# Patient Record
Sex: MALE | Race: WHITE | NOT HISPANIC OR LATINO | Employment: OTHER | URBAN - METROPOLITAN AREA
[De-identification: names, ages, dates, MRNs, and addresses within clinical notes are randomized per-mention and may not be internally consistent; named-entity substitution may affect disease eponyms.]

---

## 2024-01-23 ENCOUNTER — OFFICE VISIT (OUTPATIENT)
Dept: PRIMARY CARE | Facility: CLINIC | Age: 64
End: 2024-01-23
Payer: MEDICAID

## 2024-01-23 VITALS
OXYGEN SATURATION: 95 % | DIASTOLIC BLOOD PRESSURE: 72 MMHG | HEART RATE: 51 BPM | WEIGHT: 121.4 LBS | SYSTOLIC BLOOD PRESSURE: 112 MMHG | RESPIRATION RATE: 16 BRPM | BODY MASS INDEX: 19.06 KG/M2 | HEIGHT: 67 IN

## 2024-01-23 DIAGNOSIS — Z12.5 SCREENING FOR PROSTATE CANCER: ICD-10-CM

## 2024-01-23 DIAGNOSIS — E55.9 VITAMIN D DEFICIENCY: ICD-10-CM

## 2024-01-23 DIAGNOSIS — Z13.29 SCREENING FOR THYROID DISORDER: ICD-10-CM

## 2024-01-23 DIAGNOSIS — F17.210 TOBACCO DEPENDENCE DUE TO CIGARETTES: ICD-10-CM

## 2024-01-23 DIAGNOSIS — Z00.00 ANNUAL PHYSICAL EXAM: Primary | ICD-10-CM

## 2024-01-23 DIAGNOSIS — Z13.220 SCREENING FOR LIPID DISORDERS: ICD-10-CM

## 2024-01-23 DIAGNOSIS — Z13.1 SCREENING FOR DIABETES MELLITUS: ICD-10-CM

## 2024-01-23 DIAGNOSIS — G71.09: ICD-10-CM

## 2024-01-23 DIAGNOSIS — R06.02 SOB (SHORTNESS OF BREATH): ICD-10-CM

## 2024-01-23 PROBLEM — H81.92 VESTIBULOPATHY, LEFT: Status: ACTIVE | Noted: 2024-01-23

## 2024-01-23 PROBLEM — N52.9 ERECTILE DYSFUNCTION: Status: ACTIVE | Noted: 2024-01-23

## 2024-01-23 PROBLEM — H81.09 MENIERE DISEASE: Status: ACTIVE | Noted: 2024-01-23

## 2024-01-23 PROBLEM — H90.3 ASNHL (ASYMMETRICAL SENSORINEURAL HEARING LOSS): Status: ACTIVE | Noted: 2024-01-23

## 2024-01-23 PROBLEM — R26.81 UNSTEADINESS ON FEET: Status: ACTIVE | Noted: 2024-01-23

## 2024-01-23 PROBLEM — H02.403 PTOSIS OF BOTH EYELIDS: Status: ACTIVE | Noted: 2024-01-23

## 2024-01-23 PROBLEM — H90.A22 SENSORINEURAL HEARING LOSS (SNHL) OF LEFT EAR WITH RESTRICTED HEARING OF RIGHT EAR: Status: ACTIVE | Noted: 2024-01-23

## 2024-01-23 PROBLEM — R42 DIZZY SPELLS: Status: ACTIVE | Noted: 2024-01-23

## 2024-01-23 PROBLEM — H81.02: Status: ACTIVE | Noted: 2020-01-23

## 2024-01-23 PROBLEM — H81.391 OTHER PERIPHERAL VERTIGO, RIGHT EAR: Status: ACTIVE | Noted: 2024-01-23

## 2024-01-23 PROCEDURE — 99396 PREV VISIT EST AGE 40-64: CPT | Performed by: NURSE PRACTITIONER

## 2024-01-23 PROCEDURE — 4004F PT TOBACCO SCREEN RCVD TLK: CPT | Performed by: NURSE PRACTITIONER

## 2024-01-23 PROCEDURE — 99214 OFFICE O/P EST MOD 30 MIN: CPT | Performed by: NURSE PRACTITIONER

## 2024-01-23 RX ORDER — ASPIRIN 81 MG/1
81 TABLET ORAL DAILY
COMMUNITY
Start: 2019-03-15 | End: 2024-01-23 | Stop reason: SDUPTHER

## 2024-01-23 RX ORDER — ALBUTEROL SULFATE 90 UG/1
2 AEROSOL, METERED RESPIRATORY (INHALATION) EVERY 6 HOURS PRN
Qty: 18 G | Refills: 3 | Status: SHIPPED | OUTPATIENT
Start: 2024-01-23

## 2024-01-23 RX ORDER — ALBUTEROL SULFATE 90 UG/1
AEROSOL, METERED RESPIRATORY (INHALATION)
COMMUNITY
Start: 2019-07-02 | End: 2024-01-23 | Stop reason: SDUPTHER

## 2024-01-23 RX ORDER — ASPIRIN 81 MG/1
81 TABLET ORAL DAILY
Qty: 90 TABLET | Refills: 3 | Status: SHIPPED | OUTPATIENT
Start: 2024-01-23

## 2024-01-23 ASSESSMENT — ANXIETY QUESTIONNAIRES
2. NOT BEING ABLE TO STOP OR CONTROL WORRYING: NOT AT ALL
3. WORRYING TOO MUCH ABOUT DIFFERENT THINGS: NOT AT ALL
6. BECOMING EASILY ANNOYED OR IRRITABLE: NOT AT ALL
5. BEING SO RESTLESS THAT IT IS HARD TO SIT STILL: NOT AT ALL
1. FEELING NERVOUS, ANXIOUS, OR ON EDGE: NOT AT ALL
GAD7 TOTAL SCORE: 0
4. TROUBLE RELAXING: NOT AT ALL
IF YOU CHECKED OFF ANY PROBLEMS ON THIS QUESTIONNAIRE, HOW DIFFICULT HAVE THESE PROBLEMS MADE IT FOR YOU TO DO YOUR WORK, TAKE CARE OF THINGS AT HOME, OR GET ALONG WITH OTHER PEOPLE: NOT DIFFICULT AT ALL
7. FEELING AFRAID AS IF SOMETHING AWFUL MIGHT HAPPEN: NOT AT ALL

## 2024-01-23 ASSESSMENT — ENCOUNTER SYMPTOMS
OCCASIONAL FEELINGS OF UNSTEADINESS: 0
DEPRESSION: 0
LOSS OF SENSATION IN FEET: 0

## 2024-01-23 ASSESSMENT — COLUMBIA-SUICIDE SEVERITY RATING SCALE - C-SSRS
2. HAVE YOU ACTUALLY HAD ANY THOUGHTS OF KILLING YOURSELF?: NO
1. IN THE PAST MONTH, HAVE YOU WISHED YOU WERE DEAD OR WISHED YOU COULD GO TO SLEEP AND NOT WAKE UP?: NO
6. HAVE YOU EVER DONE ANYTHING, STARTED TO DO ANYTHING, OR PREPARED TO DO ANYTHING TO END YOUR LIFE?: NO

## 2024-01-23 ASSESSMENT — PATIENT HEALTH QUESTIONNAIRE - PHQ9
2. FEELING DOWN, DEPRESSED OR HOPELESS: NOT AT ALL
1. LITTLE INTEREST OR PLEASURE IN DOING THINGS: NOT AT ALL
SUM OF ALL RESPONSES TO PHQ9 QUESTIONS 1 AND 2: 0

## 2024-01-23 NOTE — PROGRESS NOTES
"Subjective   Patient ID: Javon Ferreira is a 63 y.o. male who presents for dizzy spells.    Patient is following up for annual physical exam and management of exertional shortness of breath, tobacco abuse due to cigarettes, Ménière's disease and oculopharyngeal muscular dystrophy.  He was last seen by me in December 2021.  He completed a screening colonoscopy on 10/6/2021 with recommendation to repeat for surveillance in 10 years.  He is requesting for a refill of aspirin 81 mg daily and albuterol inhaler as needed for shortness of breath.  He tells me that he was diagnosed with ocular pharyngeal muscular dystrophy last year and was referred for speech therapy, but he failed to follow up due to distance.  He is requesting to be referred to a local speech therapist.  Patient follows with both ENT for Ménière's disease and neurologist.         Review of Systems   All other systems reviewed and are negative.      Objective   /72   Pulse 51   Resp 16   Ht 1.702 m (5' 7\")   Wt 55.1 kg (121 lb 6.4 oz)   SpO2 95%   BMI 19.01 kg/m²     Physical Exam  Constitutional:       Appearance: Normal appearance.   HENT:      Head: Normocephalic and atraumatic.      Right Ear: External ear normal.      Left Ear: External ear normal.      Nose: Nose normal.      Mouth/Throat:      Mouth: Mucous membranes are moist.   Eyes:      Extraocular Movements: Extraocular movements intact.      Conjunctiva/sclera: Conjunctivae normal.      Pupils: Pupils are equal, round, and reactive to light.   Cardiovascular:      Rate and Rhythm: Regular rhythm. Bradycardia present.      Pulses: Normal pulses.      Heart sounds: Normal heart sounds.   Pulmonary:      Effort: Pulmonary effort is normal.      Breath sounds: Normal breath sounds.   Abdominal:      General: Abdomen is flat. Bowel sounds are normal.      Palpations: Abdomen is soft.   Musculoskeletal:      Cervical back: Neck supple.   Skin:     General: Skin is warm and dry. "   Neurological:      Mental Status: He is alert and oriented to person, place, and time. Mental status is at baseline.   Psychiatric:         Mood and Affect: Mood normal.         Behavior: Behavior normal.         Thought Content: Thought content normal.         Judgment: Judgment normal.         Assessment/Plan   Problem List Items Addressed This Visit       Oculopharyngeal muscular dystrophy (CMS/HCC) - Primary    Relevant Orders    Referral to Speech Therapy     Other Visit Diagnoses       Annual physical exam        Relevant Medications    aspirin 81 mg EC tablet    Other Relevant Orders    CBC    Vitamin D deficiency        Relevant Orders    Vitamin D 25-Hydroxy,Total (for eval of Vitamin D levels)    Vitamin B12    Screening for lipid disorders        Relevant Orders    Lipid panel    Screening for thyroid disorder        Relevant Orders    TSH with reflex to Free T4 if abnormal    Screening for diabetes mellitus        Relevant Orders    Comprehensive Metabolic Panel    Screening for prostate cancer        Relevant Orders    PSA    SOB (shortness of breath)        Relevant Medications    albuterol 90 mcg/actuation inhaler    Other Relevant Orders    Follow Up In [facility] - PCP - Established

## 2024-01-23 NOTE — PATIENT INSTRUCTIONS
I have referred you to speech therapist as requested. Continue taking all current medications as prescribed, complete labs and follow up in 6 months.

## 2024-01-29 ENCOUNTER — LAB (OUTPATIENT)
Dept: LAB | Facility: LAB | Age: 64
End: 2024-01-29
Payer: MEDICAID

## 2024-01-29 DIAGNOSIS — E55.9 VITAMIN D DEFICIENCY: Primary | ICD-10-CM

## 2024-01-29 DIAGNOSIS — Z12.5 SCREENING FOR PROSTATE CANCER: ICD-10-CM

## 2024-01-29 DIAGNOSIS — E55.9 VITAMIN D DEFICIENCY: ICD-10-CM

## 2024-01-29 DIAGNOSIS — Z13.29 SCREENING FOR THYROID DISORDER: ICD-10-CM

## 2024-01-29 DIAGNOSIS — Z13.1 SCREENING FOR DIABETES MELLITUS: ICD-10-CM

## 2024-01-29 DIAGNOSIS — Z00.00 ANNUAL PHYSICAL EXAM: ICD-10-CM

## 2024-01-29 DIAGNOSIS — E78.5 HYPERLIPIDEMIA, UNSPECIFIED HYPERLIPIDEMIA TYPE: ICD-10-CM

## 2024-01-29 DIAGNOSIS — Z13.220 SCREENING FOR LIPID DISORDERS: ICD-10-CM

## 2024-01-29 LAB
25(OH)D3 SERPL-MCNC: 23 NG/ML (ref 30–100)
ALBUMIN SERPL BCP-MCNC: 4.4 G/DL (ref 3.4–5)
ALP SERPL-CCNC: 66 U/L (ref 33–136)
ALT SERPL W P-5'-P-CCNC: 22 U/L (ref 10–52)
ANION GAP SERPL CALC-SCNC: 12 MMOL/L (ref 10–20)
AST SERPL W P-5'-P-CCNC: 18 U/L (ref 9–39)
BILIRUB SERPL-MCNC: 0.5 MG/DL (ref 0–1.2)
BUN SERPL-MCNC: 18 MG/DL (ref 6–23)
CALCIUM SERPL-MCNC: 9.4 MG/DL (ref 8.6–10.3)
CHLORIDE SERPL-SCNC: 105 MMOL/L (ref 98–107)
CHOLEST SERPL-MCNC: 182 MG/DL (ref 0–199)
CHOLESTEROL/HDL RATIO: 3.5
CO2 SERPL-SCNC: 27 MMOL/L (ref 21–32)
CREAT SERPL-MCNC: 0.87 MG/DL (ref 0.5–1.3)
EGFRCR SERPLBLD CKD-EPI 2021: >90 ML/MIN/1.73M*2
ERYTHROCYTE [DISTWIDTH] IN BLOOD BY AUTOMATED COUNT: 13.3 % (ref 11.5–14.5)
GLUCOSE SERPL-MCNC: 86 MG/DL (ref 74–99)
HCT VFR BLD AUTO: 44.2 % (ref 41–52)
HDLC SERPL-MCNC: 52.3 MG/DL
HGB BLD-MCNC: 14.1 G/DL (ref 13.5–17.5)
LDLC SERPL CALC-MCNC: 115 MG/DL
MCH RBC QN AUTO: 28.8 PG (ref 26–34)
MCHC RBC AUTO-ENTMCNC: 31.9 G/DL (ref 32–36)
MCV RBC AUTO: 90 FL (ref 80–100)
NON HDL CHOLESTEROL: 130 MG/DL (ref 0–149)
NRBC BLD-RTO: 0 /100 WBCS (ref 0–0)
PLATELET # BLD AUTO: 318 X10*3/UL (ref 150–450)
POTASSIUM SERPL-SCNC: 4.4 MMOL/L (ref 3.5–5.3)
PROT SERPL-MCNC: 7.4 G/DL (ref 6.4–8.2)
PSA SERPL-MCNC: 1.04 NG/ML
RBC # BLD AUTO: 4.9 X10*6/UL (ref 4.5–5.9)
SODIUM SERPL-SCNC: 140 MMOL/L (ref 136–145)
TRIGL SERPL-MCNC: 72 MG/DL (ref 0–149)
TSH SERPL-ACNC: 2.62 MIU/L (ref 0.44–3.98)
VIT B12 SERPL-MCNC: 539 PG/ML (ref 211–911)
VLDL: 14 MG/DL (ref 0–40)
WBC # BLD AUTO: 9.1 X10*3/UL (ref 4.4–11.3)

## 2024-01-29 PROCEDURE — 82306 VITAMIN D 25 HYDROXY: CPT

## 2024-01-29 PROCEDURE — 82607 VITAMIN B-12: CPT

## 2024-01-29 PROCEDURE — 80053 COMPREHEN METABOLIC PANEL: CPT

## 2024-01-29 PROCEDURE — 84153 ASSAY OF PSA TOTAL: CPT

## 2024-01-29 PROCEDURE — 80061 LIPID PANEL: CPT

## 2024-01-29 PROCEDURE — 36415 COLL VENOUS BLD VENIPUNCTURE: CPT

## 2024-01-29 PROCEDURE — 84443 ASSAY THYROID STIM HORMONE: CPT

## 2024-01-29 PROCEDURE — 85027 COMPLETE CBC AUTOMATED: CPT

## 2024-01-29 RX ORDER — ROSUVASTATIN CALCIUM 10 MG/1
10 TABLET, COATED ORAL DAILY
Qty: 90 TABLET | Refills: 2 | Status: SHIPPED | OUTPATIENT
Start: 2024-01-29 | End: 2024-10-25

## 2024-01-29 RX ORDER — ERGOCALCIFEROL 1.25 MG/1
50000 CAPSULE ORAL
Qty: 12 CAPSULE | Refills: 2 | Status: SHIPPED | OUTPATIENT
Start: 2024-01-29 | End: 2024-10-25

## 2024-01-30 ENCOUNTER — TELEPHONE (OUTPATIENT)
Dept: PRIMARY CARE | Facility: CLINIC | Age: 64
End: 2024-01-30
Payer: MEDICAID

## 2024-01-30 NOTE — TELEPHONE ENCOUNTER
----- Message from TREMAINE Joshua-CNP sent at 1/29/2024  6:44 PM EST -----  Please tell patient that his lab results are normal except his cholesterol is high and vitamin D level is low.  I have started him on rosuvastatin 10 mg daily for high cholesterol and vitamin D 50,000 unit weekly for low vitamin D levels.

## 2024-02-22 ENCOUNTER — OFFICE VISIT (OUTPATIENT)
Dept: NEUROLOGY | Facility: CLINIC | Age: 64
End: 2024-02-22
Payer: MEDICAID

## 2024-02-22 VITALS
HEART RATE: 51 BPM | WEIGHT: 124 LBS | HEIGHT: 67 IN | BODY MASS INDEX: 19.46 KG/M2 | SYSTOLIC BLOOD PRESSURE: 150 MMHG | DIASTOLIC BLOOD PRESSURE: 78 MMHG

## 2024-02-22 DIAGNOSIS — M25.561 ARTHRALGIA OF BOTH KNEES: ICD-10-CM

## 2024-02-22 DIAGNOSIS — H81.09 MENIERE'S DISEASE, UNSPECIFIED LATERALITY: ICD-10-CM

## 2024-02-22 DIAGNOSIS — M25.562 ARTHRALGIA OF BOTH KNEES: ICD-10-CM

## 2024-02-22 DIAGNOSIS — G43.109 MIGRAINOUS VERTIGO: Primary | ICD-10-CM

## 2024-02-22 DIAGNOSIS — G71.09: ICD-10-CM

## 2024-02-22 PROCEDURE — 99214 OFFICE O/P EST MOD 30 MIN: CPT | Performed by: PSYCHIATRY & NEUROLOGY

## 2024-02-22 PROCEDURE — 4004F PT TOBACCO SCREEN RCVD TLK: CPT | Performed by: PSYCHIATRY & NEUROLOGY

## 2024-02-22 RX ORDER — RIBOFLAVIN (VITAMIN B2) 400 MG
400 TABLET ORAL DAILY
Qty: 90 TABLET | Refills: 2 | Status: SHIPPED | OUTPATIENT
Start: 2024-02-22

## 2024-02-22 NOTE — PROGRESS NOTES
Subjective     Javon Ferreira is a 64 y.o. year old male seen in follow-up for chronic dizziness with history of Ménière's disease, oculopharyngeal muscular dystrophy.    HPI    64-year-old  man with past medical history significant for Ménière's disease with predominantly left-sided hearing loss, surgery on the right forearm in the 1990s, longstanding bilateral ptosis OD worse than OS with history of same attributed to muscular dystrophy in his mother, headaches dating to adolescence, ongoing cigarette smoking.     I evaluated him initially on 5/13/2022, referred by otology. As detailed in my ambulatory EMR note from that date he presented for dizziness, giving a history dating back 6-7 years. He was given a diagnosis of Ménière's disease but has had persistent dizziness despite 2 intratympanic gentamicin injections as well as other interventions for Ménière's.     He also endorsed a long headache history albeit not a formal diagnosis of migraine. By description his headaches, dating to childhood or adolescence, were often compatible with migraine. As such I suspected a migrainous basis to at least some of his vertigo.     I reviewed a contrasted IAC protocol MRI from March 2019 as well as intracranial MRA done at the same date, without concerning findings.     His evaluation was otherwise notable for chronic ptosis historically worse on the right, with family history of ptosis and dysphagia in his mother, caring a diagnosis of muscular dystrophy (probably oculopharyngeal although her genetic testing records were not available at the time of his consultation). He is of [place] ancestry. He himself has not undergone genetic testing.     I recommended a preventive strategy for the possibility of at least some of his dizziness having a migrainous basis. He did not wish to go on conventional medications so I suggested riboflavin as a nutraceutical, 400 mg daily.     I evaluated him again on 9/28/2022.  He recounted a 6-week trial of riboflavin which did not improve his symptoms so he stopped it. He continued to note daily dizziness. I suggested a trial of magnesium gluconate 500 mg daily.  As detailed in my EMR note, his mother's medical records which he provided confirmed oculopharyngeal muscular dystrophy in her case, which we discussed provided the most plausible explanation for his symptoms of chronic bilateral ptosis and mild dysphagia.    I evaluated him most recently on 5/30/2023.  At that visit he indicated that magnesium and riboflavin were not effective and I suggested a trial of cyproheptadine 2 mg twice daily.    I also sent him for a modified barium swallow study which was completed in July and found moderate oropharyngeal dysphagia with aspiration clearing with swallow with thin liquids, penetration with low aspiration risk with nectar thick liquids and no aspiration or penetration with honey thick, purée or solids.  I referred him to speech therapy for dysphagia therapy.    He is evaluated again today in the office.    He is pending seeing a speech therapist in his area to whom his PCP has referred him.  He indicates no progressive worsening of his swallow subjectively compared to when he last saw me.  He is not having choking episodes.    He is increasingly bothered by ptosis, which on the right today often occludes the pupil.  He is interested in being evaluated for ptosis corrective surgery.  It sounds as if his mother may have had this procedure.  As it is, he often has to use his fingers to elevate his right upper lid.    Over the past month he has experienced a new phenomenon of vertigo specifically when he is lying in bed.  This seems to be when he lies on either side although he tries to avoid lying supine as he finds it less comfortable.  The spinning lasts for only a matter of seconds.    From the standpoint of Ménière's he just saw otology a couple of days ago and had another audiogram  which she indicates showed improved hearing on the left.  He is abiding by sodium and caffeine restrictions.    He has had no severe or persistent headaches recently.    He does not seem to recall having tried cyproheptadine and is not clear if he ever took it.  He indicates today that although he previously had the impression that riboflavin was ineffective after trying it for about 6 weeks, the week after he stopped that he went on a camping trip with family and felt the best he has in years.  He had virtually no vertiginous symptoms during the entire trip.  Accordingly he is wondering whether riboflavin was helpful and expresses interest in resuming it.    He endorses achy joints, particularly the knees, and reports a tick bite in the left popliteal fossa region in October 2023.  He indicates that his wife actually pulled a tick off of him.  There was subsequently a small red spot in this region, not a classic target lesion by his report.  He has not been tested for Lyme.       Review of Systems    As per the history of present illness    Patient Active Problem List   Diagnosis    ASNHL (asymmetrical sensorineural hearing loss)    Dizzy spells    Erectile dysfunction    Meniere disease    Meniere syndrome, left    Other peripheral vertigo, right ear    Oculopharyngeal muscular dystrophy (CMS/HCC)    Ptosis of both eyelids    Sensorineural hearing loss (SNHL) of left ear with restricted hearing of right ear    SOB (shortness of breath)    Tobacco dependence due to cigarettes    Unsteadiness on feet    Vestibulopathy, left    Screening for prostate cancer    Vitamin D deficiency     Past Medical History:   Diagnosis Date    Personal history of other specified conditions 01/10/2019    History of dysphagia     Past Surgical History:   Procedure Laterality Date    OTHER SURGICAL HISTORY  01/10/2019    Arm surgery     Social History     Tobacco Use    Smoking status: Every Day     Packs/day: 1     Types: Cigarettes     Smokeless tobacco: Never   Substance Use Topics    Alcohol use: Never     family history is not on file.    Current Outpatient Medications:     albuterol 90 mcg/actuation inhaler, Inhale 2 puffs every 6 hours if needed for wheezing., Disp: 18 g, Rfl: 3    aspirin 81 mg EC tablet, Take 1 tablet (81 mg) by mouth once daily., Disp: 90 tablet, Rfl: 3    ergocalciferol (Vitamin D-2) 1.25 MG (27498 UT) capsule, Take 1 capsule (50,000 Units) by mouth 1 (one) time per week., Disp: 12 capsule, Rfl: 2    rosuvastatin (Crestor) 10 mg tablet, Take 1 tablet (10 mg) by mouth once daily., Disp: 90 tablet, Rfl: 2  No Known Allergies    Objective   Neurological Exam  Physical Exam    Physical Examination:    General: Alert man who was ambulatory without assistive devices.      Cranial Nerves:  Funduscopic exam was not well visualized bilaterally on nondilated exam.  Pupils were equal, round and reactive to light with no relative afferent pupillary defect.  Extraocular movements were intact and conjugate without nystagmus.  Bilateral ptosis which was mild-moderate OS and moderate-marked OD, frequently covering the entire pupil OD.  Visual fields were full to confrontation tested binocularly.  Facial sensation was symmetric to pin.  Facial motor function was symmetrically intact; there was persistent symmetric frontalis contraction presumably as a compensation for bilateral ptosis.  Hearing was grossly intact.  No dysarthria.  Shoulder shrug was symmetric.  Tongue protrusion was midline.    Motor: Muscle tone was normal throughout.  Confrontation strength was symmetrically 5/5 throughout including neck flexors, neck extensors and proximal and distal upper and lower extremities.    Coordination: No postural or rest tremor, myoclonus or dystonic posturing.    Sensation: Romberg sign was absent.    Station: Intact and stable.    Gait: Stable and unremarkable.    Other: Supine position with head neutral did not elicit vertigo, nor did  supine head turned to his right.  On supine head turn to his left he indicated briefly feeling vertiginous but I could not be sure of corresponding nystagmus.  Hallpike maneuver failed to elicit nystagmus or vertigo in either left or right ear down positions.      Assessment/Plan     We discussed the number of issues today.    From the standpoint of vertigo he is increasingly complex.  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  Leftward supine head turn did seem to elicit brief vertigo and he may have horizontal canal involvement.  In any event I discussed the option of a referral to vestibular therapy for canalith repositioning therapy, but he did not feel it was necessary at this point.    He will continue following with otology for management of Ménière's disease and will remain on sodium and caffeine restriction.    I suggested he resume riboflavin 400 mg daily as a migraine preventative, as he thinks in hindsight it may have conferred some benefit.    He is not having any severe or persistent headaches currently.    From the standpoint of oculopharyngeal muscular dystrophy, his ptosis especially OD is at the point of interfering with vision and he asked about corrective surgery.  I am providing a referral to oculoplastics for this purpose.    He is pending an appointment with speech therapy for dysphagia therapy with MBS results as given above.    Given that he has been noting arthralgias and gives a history of a tick bite in October I recommended checking a Lyme assay.  I did review with him the potential for false negative and false positive results and he signed the required  consent form in this regard.  The office will contact him regarding the Lyme testing results.    I advised him to follow-up in the office in 8 months.

## 2024-02-22 NOTE — PATIENT INSTRUCTIONS
We discussed that your new kind of vertigo that you have been noting when lying in bed is suggestive of benign positional vertigo (BPPV).  If this gets worse I can refer you to vestibular therapy to work on correcting it.    Since you felt much better after taking riboflavin, I recommend resuming 400 mg daily.  I sent a new prescription to your pharmacy.    You expressed interest in seeing a specialist about ptosis corrective surgery since the eyelids particularly on the right side are starting to interfere significantly with your vision.  I am providing a referral to oculoplastics.    You indicated joint pains particularly at the knees, since getting a tick bite in October.  We discussed Lyme testing and I am sending you for a blood test.  The office will call you with the result.    Please see me in the office in 8 months.

## 2024-04-02 ENCOUNTER — EVALUATION (OUTPATIENT)
Dept: SPEECH THERAPY | Facility: HOSPITAL | Age: 64
End: 2024-04-02
Payer: MEDICAID

## 2024-04-02 DIAGNOSIS — G71.09: ICD-10-CM

## 2024-04-02 DIAGNOSIS — R13.12 OROPHARYNGEAL DYSPHAGIA: Primary | ICD-10-CM

## 2024-04-02 PROCEDURE — 92610 EVALUATE SWALLOWING FUNCTION: CPT | Mod: GN | Performed by: PHARMACIST

## 2024-04-02 ASSESSMENT — PATIENT HEALTH QUESTIONNAIRE - PHQ9
10. IF YOU CHECKED OFF ANY PROBLEMS, HOW DIFFICULT HAVE THESE PROBLEMS MADE IT FOR YOU TO DO YOUR WORK, TAKE CARE OF THINGS AT HOME, OR GET ALONG WITH OTHER PEOPLE: SOMEWHAT DIFFICULT
SUM OF ALL RESPONSES TO PHQ9 QUESTIONS 1 AND 2: 1
2. FEELING DOWN, DEPRESSED OR HOPELESS: NOT AT ALL
1. LITTLE INTEREST OR PLEASURE IN DOING THINGS: SEVERAL DAYS
SUM OF ALL RESPONSES TO PHQ9 QUESTIONS 1 AND 2: 2
2. FEELING DOWN, DEPRESSED OR HOPELESS: NOT AT ALL
1. LITTLE INTEREST OR PLEASURE IN DOING THINGS: MORE THAN HALF THE DAYS

## 2024-04-02 ASSESSMENT — PAIN - FUNCTIONAL ASSESSMENT: PAIN_FUNCTIONAL_ASSESSMENT: 0-10

## 2024-04-02 ASSESSMENT — ENCOUNTER SYMPTOMS
LOSS OF SENSATION IN FEET: 0
DEPRESSION: 0
OCCASIONAL FEELINGS OF UNSTEADINESS: 0

## 2024-04-02 ASSESSMENT — PAIN SCALES - GENERAL: PAINLEVEL_OUTOF10: 0 - NO PAIN

## 2024-04-02 NOTE — PROGRESS NOTES
Speech-Language Pathology Clinical Swallow Evaluation and Discharge    Patient Name: Javon Ferreira  MRN: [id number]  : 1960  Today's Date: 24  Start time:  1039  Stop time:  1135  Time calculation (min) :  56 min      ASSESSMENT  Impressions:  Results this date are grossly consistent with results of MBSS on 23 which revealed moderate oral phase and moderate pharyngeal phase dysphagia.  Prognosis: Fair    PLAN  Recommendations:  MBSS recommended: No, due to recent MBSS completed.  Solid consistency: Soft/bite-sized (IDDSI level 6)  Liquid consistency: Thin (IDDSI 0)  Medication administration: Crushed, in puree, (verify with pharmacy/physician)  Compensatory swallow strategies:   Do NOT use chin tuck with liquids (OK to use chin tuck with solids per pt preference). Small bites/sips, one sip at a time, 2-3 effortful swallows per bolus, chew thoroughly, alternate liquids and solids. Upright positioning for all PO intake and remain upright for at least 30 minutes after eating. Continue to complete dry effortful swallows after eating to clear pharyngeal residuals.    Recommended frequency/duration:  Skilled SLP services recommended: Yes, however pt chooses not to complete dysphagia tx at this time.  Frequency: N/A  Duration: N/A      Goals:  N/A      SUBJECTIVE    PMHx relevant to rehab: Meniere's disease, oculopharyngeal muscular dystrophy, migrainous vertigo    Chief complaint: Pt with hx of dysphagia in the setting of oculopharyngeal muscular dystrophy. He completed MBSS on 23 which revealed moderate oropharyngeal dysphagia to liquids and solids. Pt endorses dysphagia to solids but not to liquids. He reports that he chooses soft food items grossly consistent with a soft/bite-sized diet. He reports that he completes multiple swallows per bolus as needed, uses liquid wash, and if needed, regurgitates food boluses and re-chews/re-swallows. Pt reports that pills intermittently get stuck in his  throat, and he occasionally regurgitates portions of the pills the next morning. He reports that he did lose weight in the past when he was frightened of choking, but now feels that he compensates adequately for his dysphagia, does not fear choking, and is maintaining his weight. Pt therefore declines to participate in dysphagia therapy at this time despite SLP recommendation to participate. Pt also reports others having difficulty understanding him (this could be partially attributable to ill-fitting dentures).    Relevant imaging results:  MBSS 7/26/23: Moderate oropharyngeal dysphagia characterized by min oral residuals, delayed swallow onset, diminished strength/ROM of pharyngeal musculature, incomplete epiglottic inversion, min-mod pharyngeal residuals, and penetration of thin/nectar-thick liquids. Transient aspiration of thin liquids x1 when chin tuck was used which cleared with the completion of the swallow.    General Visit Information:  SLP Received On: 04/02/24  Patient Class: Outpatient  Living Environment: Home  Ordering Physician: Rohit Ramos  Reason for Referral: Oculopharyngeal muscular dystrophy    Date of Onset: 07/27/23  Date of Order: 01/23/24  [employer] Diet: Soft/thin  Current Diet : Soft/thin    Pain Assessment  Pain Assessment: 0-10  Pain Score: 0 - No pain    Pt was alert, pleasant, and cooperative for session.  Ability to follow functional commands: WFL  Nutritional status: Signs of possible malnutrition    Respiratory status: Room air  Baseline Vocal Quality: Normal (intermittently wet)  Volitional Swallow: Delayed      OBJECTIVE  Clinical swallow evaluation completed and consisted of interview, oral motor assessment, and PO trials (thin liquids ~2oz via bottle, diced fruit x3 bites, brooke crackers x2 bites).  ORAL PHASE: Upper denture in place; pt reports he can no longer wear his lower dentures consistently due to poor fit. Oral mucosa were pink, moist, and free of obvious lesions.  Lingual strength and ROM were mildly reduced bilaterally. Labial strength/ROM were mildly reduced bilaterally. Labial seal was adequate. Mastication of regular solids was questionably insufficient. A/P transit and oral clearance were adequate.  PHARYNGEAL PHASE: Laryngeal elevation was visualized or palpated with all trials and appeared diminished, however adequacy of hyolaryngeal elevation/excursion cannot be determined at bedside. Pt completed 3-5 swallows per bolus. No immediate or delayed s/sx aspiration/penetration were observed with any consistencies.    Was 3oz challenge administered: No, deferred due to safety concerns (pt recommended for small/single sips after MBSS).      Treatment/Education:  Results and recommendations were relayed to: Patient  Education provided: Yes   Learner: Patient   Barriers to learning: Hearing impairment barrier   Method of teaching: Verbal   Topic: results of assessment, risk for aspiration, recommended diet modifications, recommended safe swallow strategies (including do not use chin tuck with liquids), and recommendation for dysphagia therapy to improve current swallow function as well as to prevent/slow decline of swallow function   Outcome of teaching: Pt verbalized understanding  Treatment provided: No

## 2024-04-17 ENCOUNTER — LAB (OUTPATIENT)
Dept: LAB | Facility: LAB | Age: 64
End: 2024-04-17
Payer: MEDICAID

## 2024-04-17 DIAGNOSIS — M25.562 ARTHRALGIA OF BOTH KNEES: ICD-10-CM

## 2024-04-17 DIAGNOSIS — M25.561 ARTHRALGIA OF BOTH KNEES: ICD-10-CM

## 2024-04-17 PROCEDURE — 36415 COLL VENOUS BLD VENIPUNCTURE: CPT

## 2024-04-17 PROCEDURE — 86618 LYME DISEASE ANTIBODY: CPT

## 2024-04-19 ENCOUNTER — TELEPHONE (OUTPATIENT)
Dept: NEUROLOGY | Facility: CLINIC | Age: 64
End: 2024-04-19
Payer: MEDICAID

## 2024-04-19 LAB — B BURGDOR.VLSE1+PEPC10 AB SER IA-ACNC: 0.51 IV

## 2024-04-19 NOTE — TELEPHONE ENCOUNTER
----- Message from Javon Westfall MD sent at 4/19/2024  3:10 PM EDT -----  Please inform him that his Lyme blood test is negative.

## 2024-06-05 ENCOUNTER — PHARMACY VISIT (OUTPATIENT)
Dept: PHARMACY | Facility: CLINIC | Age: 64
End: 2024-06-05
Payer: MEDICAID

## 2024-06-05 ENCOUNTER — APPOINTMENT (OUTPATIENT)
Dept: RADIOLOGY | Facility: HOSPITAL | Age: 64
End: 2024-06-05
Payer: MEDICAID

## 2024-06-05 ENCOUNTER — HOSPITAL ENCOUNTER (EMERGENCY)
Facility: HOSPITAL | Age: 64
Discharge: HOME | End: 2024-06-05
Attending: EMERGENCY MEDICINE
Payer: MEDICAID

## 2024-06-05 VITALS
TEMPERATURE: 98.6 F | HEIGHT: 67 IN | DIASTOLIC BLOOD PRESSURE: 74 MMHG | SYSTOLIC BLOOD PRESSURE: 117 MMHG | BODY MASS INDEX: 18.83 KG/M2 | WEIGHT: 120 LBS | HEART RATE: 41 BPM | OXYGEN SATURATION: 98 % | RESPIRATION RATE: 19 BRPM

## 2024-06-05 DIAGNOSIS — N20.1 URETEROLITHIASIS: Primary | ICD-10-CM

## 2024-06-05 LAB
ANION GAP SERPL CALC-SCNC: 12 MMOL/L (ref 10–20)
APPEARANCE UR: CLEAR
BASOPHILS # BLD AUTO: 0.11 X10*3/UL (ref 0–0.1)
BASOPHILS NFR BLD AUTO: 1.3 %
BILIRUB UR STRIP.AUTO-MCNC: NEGATIVE MG/DL
BUN SERPL-MCNC: 21 MG/DL (ref 6–23)
CALCIUM SERPL-MCNC: 9.6 MG/DL (ref 8.6–10.3)
CHLORIDE SERPL-SCNC: 104 MMOL/L (ref 98–107)
CO2 SERPL-SCNC: 26 MMOL/L (ref 21–32)
COLOR UR: YELLOW
CREAT SERPL-MCNC: 1.13 MG/DL (ref 0.5–1.3)
EGFRCR SERPLBLD CKD-EPI 2021: 73 ML/MIN/1.73M*2
EOSINOPHIL # BLD AUTO: 0.18 X10*3/UL (ref 0–0.7)
EOSINOPHIL NFR BLD AUTO: 2.1 %
ERYTHROCYTE [DISTWIDTH] IN BLOOD BY AUTOMATED COUNT: 12.7 % (ref 11.5–14.5)
GLUCOSE SERPL-MCNC: 89 MG/DL (ref 74–99)
GLUCOSE UR STRIP.AUTO-MCNC: NORMAL MG/DL
HCT VFR BLD AUTO: 43.6 % (ref 41–52)
HGB BLD-MCNC: 14.7 G/DL (ref 13.5–17.5)
IMM GRANULOCYTES # BLD AUTO: 0.02 X10*3/UL (ref 0–0.7)
IMM GRANULOCYTES NFR BLD AUTO: 0.2 % (ref 0–0.9)
KETONES UR STRIP.AUTO-MCNC: ABNORMAL MG/DL
LEUKOCYTE ESTERASE UR QL STRIP.AUTO: NEGATIVE
LYMPHOCYTES # BLD AUTO: 2.34 X10*3/UL (ref 1.2–4.8)
LYMPHOCYTES NFR BLD AUTO: 27.9 %
MCH RBC QN AUTO: 29.2 PG (ref 26–34)
MCHC RBC AUTO-ENTMCNC: 33.7 G/DL (ref 32–36)
MCV RBC AUTO: 87 FL (ref 80–100)
MONOCYTES # BLD AUTO: 1.14 X10*3/UL (ref 0.1–1)
MONOCYTES NFR BLD AUTO: 13.6 %
MUCOUS THREADS #/AREA URNS AUTO: ABNORMAL /LPF
NEUTROPHILS # BLD AUTO: 4.59 X10*3/UL (ref 1.2–7.7)
NEUTROPHILS NFR BLD AUTO: 54.9 %
NITRITE UR QL STRIP.AUTO: NEGATIVE
NRBC BLD-RTO: 0 /100 WBCS (ref 0–0)
PH UR STRIP.AUTO: 5.5 [PH]
PLATELET # BLD AUTO: 265 X10*3/UL (ref 150–450)
POTASSIUM SERPL-SCNC: 4 MMOL/L (ref 3.5–5.3)
PROT UR STRIP.AUTO-MCNC: ABNORMAL MG/DL
RBC # BLD AUTO: 5.04 X10*6/UL (ref 4.5–5.9)
RBC # UR STRIP.AUTO: ABNORMAL /UL
RBC #/AREA URNS AUTO: >20 /HPF
SODIUM SERPL-SCNC: 138 MMOL/L (ref 136–145)
SP GR UR STRIP.AUTO: 1.03
SQUAMOUS #/AREA URNS AUTO: ABNORMAL /HPF
UROBILINOGEN UR STRIP.AUTO-MCNC: NORMAL MG/DL
WBC # BLD AUTO: 8.4 X10*3/UL (ref 4.4–11.3)
WBC #/AREA URNS AUTO: ABNORMAL /HPF
WBC CLUMPS #/AREA URNS AUTO: ABNORMAL /HPF

## 2024-06-05 PROCEDURE — 96361 HYDRATE IV INFUSION ADD-ON: CPT | Performed by: EMERGENCY MEDICINE

## 2024-06-05 PROCEDURE — 74176 CT ABD & PELVIS W/O CONTRAST: CPT | Mod: FOREIGN READ | Performed by: RADIOLOGY

## 2024-06-05 PROCEDURE — 85025 COMPLETE CBC W/AUTO DIFF WBC: CPT | Performed by: EMERGENCY MEDICINE

## 2024-06-05 PROCEDURE — 74176 CT ABD & PELVIS W/O CONTRAST: CPT

## 2024-06-05 PROCEDURE — 2500000001 HC RX 250 WO HCPCS SELF ADMINISTERED DRUGS (ALT 637 FOR MEDICARE OP): Performed by: EMERGENCY MEDICINE

## 2024-06-05 PROCEDURE — 36415 COLL VENOUS BLD VENIPUNCTURE: CPT | Performed by: EMERGENCY MEDICINE

## 2024-06-05 PROCEDURE — RXMED WILLOW AMBULATORY MEDICATION CHARGE

## 2024-06-05 PROCEDURE — 96374 THER/PROPH/DIAG INJ IV PUSH: CPT | Performed by: EMERGENCY MEDICINE

## 2024-06-05 PROCEDURE — 2500000004 HC RX 250 GENERAL PHARMACY W/ HCPCS (ALT 636 FOR OP/ED): Performed by: EMERGENCY MEDICINE

## 2024-06-05 PROCEDURE — 99284 EMERGENCY DEPT VISIT MOD MDM: CPT | Performed by: EMERGENCY MEDICINE

## 2024-06-05 PROCEDURE — 80048 BASIC METABOLIC PNL TOTAL CA: CPT | Performed by: EMERGENCY MEDICINE

## 2024-06-05 PROCEDURE — 96375 TX/PRO/DX INJ NEW DRUG ADDON: CPT | Performed by: EMERGENCY MEDICINE

## 2024-06-05 PROCEDURE — 81001 URINALYSIS AUTO W/SCOPE: CPT | Performed by: EMERGENCY MEDICINE

## 2024-06-05 RX ORDER — ONDANSETRON HYDROCHLORIDE 2 MG/ML
4 INJECTION, SOLUTION INTRAVENOUS ONCE
Status: COMPLETED | OUTPATIENT
Start: 2024-06-05 | End: 2024-06-05

## 2024-06-05 RX ORDER — KETOROLAC TROMETHAMINE 30 MG/ML
15 INJECTION, SOLUTION INTRAMUSCULAR; INTRAVENOUS ONCE
Status: COMPLETED | OUTPATIENT
Start: 2024-06-05 | End: 2024-06-05

## 2024-06-05 RX ORDER — OXYCODONE AND ACETAMINOPHEN 5; 325 MG/1; MG/1
1 TABLET ORAL ONCE
Status: COMPLETED | OUTPATIENT
Start: 2024-06-05 | End: 2024-06-05

## 2024-06-05 RX ORDER — OXYCODONE AND ACETAMINOPHEN 5; 325 MG/1; MG/1
1 TABLET ORAL EVERY 6 HOURS PRN
Qty: 10 TABLET | Refills: 0 | Status: SHIPPED | OUTPATIENT
Start: 2024-06-05 | End: 2024-06-08

## 2024-06-05 RX ORDER — MORPHINE SULFATE 4 MG/ML
4 INJECTION INTRAVENOUS ONCE
Status: COMPLETED | OUTPATIENT
Start: 2024-06-05 | End: 2024-06-05

## 2024-06-05 RX ADMIN — MORPHINE SULFATE 4 MG: 4 INJECTION INTRAVENOUS at 11:00

## 2024-06-05 RX ADMIN — OXYCODONE HYDROCHLORIDE AND ACETAMINOPHEN 1 TABLET: 5; 325 TABLET ORAL at 13:37

## 2024-06-05 RX ADMIN — ONDANSETRON 4 MG: 2 INJECTION INTRAMUSCULAR; INTRAVENOUS at 10:27

## 2024-06-05 RX ADMIN — SODIUM CHLORIDE 1000 ML: 9 INJECTION, SOLUTION INTRAVENOUS at 10:27

## 2024-06-05 RX ADMIN — KETOROLAC TROMETHAMINE 15 MG: 30 INJECTION, SOLUTION INTRAMUSCULAR at 10:26

## 2024-06-05 ASSESSMENT — PAIN DESCRIPTION - LOCATION
LOCATION: ABDOMEN

## 2024-06-05 ASSESSMENT — COLUMBIA-SUICIDE SEVERITY RATING SCALE - C-SSRS
1. IN THE PAST MONTH, HAVE YOU WISHED YOU WERE DEAD OR WISHED YOU COULD GO TO SLEEP AND NOT WAKE UP?: NO
2. HAVE YOU ACTUALLY HAD ANY THOUGHTS OF KILLING YOURSELF?: NO
6. HAVE YOU EVER DONE ANYTHING, STARTED TO DO ANYTHING, OR PREPARED TO DO ANYTHING TO END YOUR LIFE?: NO

## 2024-06-05 ASSESSMENT — PAIN SCALES - GENERAL
PAINLEVEL_OUTOF10: 10 - WORST POSSIBLE PAIN
PAINLEVEL_OUTOF10: 5 - MODERATE PAIN

## 2024-06-05 ASSESSMENT — PAIN - FUNCTIONAL ASSESSMENT
PAIN_FUNCTIONAL_ASSESSMENT: 0-10
PAIN_FUNCTIONAL_ASSESSMENT: 0-10

## 2024-06-05 ASSESSMENT — PAIN SCALES - PAIN ASSESSMENT IN ADVANCED DEMENTIA (PAINAD): TOTALSCORE: MEDICATION (SEE MAR)

## 2024-06-05 ASSESSMENT — PAIN DESCRIPTION - ORIENTATION
ORIENTATION: RIGHT
ORIENTATION: RIGHT

## 2024-06-05 ASSESSMENT — PAIN DESCRIPTION - PAIN TYPE: TYPE: ACUTE PAIN

## 2024-06-05 NOTE — ED PROVIDER NOTES
HPI   Chief Complaint   Patient presents with   • Abdominal Pain     Right lower quadrant, began at 9am suddenly        Patient presents with right lower abdominal pain.  Started 1 hour ago.  He describes a burning sensation in the right lower quadrant that radiates into his back.  He had nausea at home without vomiting.  He also had an episode of diarrhea.  He describes some burning with urination but no blood.  He denies any history of kidney stones in the past.  No documented fevers.  He has had no abdominal surgeries in the past.  He took nothing for the pain at home.                          [place] Coma Scale Score: 15                  Patient History   Past Medical History:   Diagnosis Date   • Personal history of other specified conditions 01/10/2019    History of dysphagia     Past Surgical History:   Procedure Laterality Date   • OTHER SURGICAL HISTORY  01/10/2019    Arm surgery     No family history on file.  Social History     Tobacco Use   • Smoking status: Every Day     Current packs/day: 1.00     Types: Cigarettes   • Smokeless tobacco: Never   Substance Use Topics   • Alcohol use: Never   • Drug use: Never       Physical Exam   ED Triage Vitals   Temperature Heart Rate Respirations BP   06/05/24 1008 06/05/24 1008 06/05/24 1008 06/05/24 1010   37 °C (98.6 °F) 54 20 (!) 187/81      Pulse Ox Temp src Heart Rate Source Patient Position   06/05/24 1008 -- -- --   100 %         BP Location FiO2 (%)     06/05/24 1008 --     Left arm        Physical Exam  Vitals and nursing note reviewed.   Constitutional:       Appearance: Normal appearance.   HENT:      Head: Normocephalic and atraumatic.      Mouth/Throat:      Mouth: Mucous membranes are moist.   Eyes:      Extraocular Movements: Extraocular movements intact.      Pupils: Pupils are equal, round, and reactive to light.   Cardiovascular:      Rate and Rhythm: Normal rate and regular rhythm.      Heart sounds: No murmur heard.  Pulmonary:      Effort:  Pulmonary effort is normal. No respiratory distress.      Breath sounds: Normal breath sounds.   Abdominal:      General: There is no distension.      Palpations: Abdomen is soft.      Tenderness: There is abdominal tenderness in the right lower quadrant. There is no guarding or rebound. Negative signs include Walton's sign and McBurney's sign.   Musculoskeletal:         General: No tenderness or deformity. Normal range of motion.      Cervical back: Neck supple.      Right lower leg: No edema.      Left lower leg: No edema.   Skin:     General: Skin is warm and dry.      Findings: No lesion or rash.   Neurological:      General: No focal deficit present.      Mental Status: He is alert and oriented to person, place, and time.      Sensory: No sensory deficit.      Motor: No weakness.   Psychiatric:         Behavior: Behavior normal.       Labs Reviewed   CBC WITH AUTO DIFFERENTIAL   BASIC METABOLIC PANEL   URINALYSIS WITH REFLEX CULTURE AND MICROSCOPIC    Narrative:     The following orders were created for panel order Urinalysis with Reflex Culture and Microscopic.  Procedure                               Abnormality         Status                     ---------                               -----------         ------                     Urinalysis with Reflex C...[[id number]]                                                 Extra Urine Gray Tube[[id number]]                                                         Please view results for these tests on the individual orders.   URINALYSIS WITH REFLEX CULTURE AND MICROSCOPIC   EXTRA URINE GRAY TUBE     CT abdomen pelvis wo IV contrast    (Results Pending)     ED Course & MDM   Diagnoses as of 06/05/24 1413   Ureterolithiasis       Medical Decision Making  Differentials include ureterolithiasis, pyelonephritis, appendicitis, bowel obstruction.  Imaging studies, if performed, were independently reviewed and interpreted by myself and confirmed by radiologist. EKG(s), if  performed, were interpreted by myself.Patient was given Toradol and subsequently morphine for pain control.  He was given IV Zofran.  Laboratory studies are unremarkable.  Urinalysis does show blood without evidence of infection.  CAT scan shows a 5 mm stone at the urinary bladder just past the right UVJ.  There is also an incidental nodule in the left lower lobe.  Patient was notified of this incidental finding.  Does have a kidney stone causing his symptoms.  No signs of infection.  He was given assessment dose of Percocet and he is feeling better at this time.  I feel he can be treated as an outpatient as it is already mostly in the bladder.  I will give him Percocet for pain control at home.  He will follow-up with his PCP.        Procedure  Procedures     Garrett Boland MD  06/05/24 3845

## 2024-06-06 LAB — HOLD SPECIMEN: NORMAL

## 2024-07-23 ENCOUNTER — APPOINTMENT (OUTPATIENT)
Dept: PRIMARY CARE | Facility: CLINIC | Age: 64
End: 2024-07-23
Payer: MEDICAID

## 2024-12-19 ENCOUNTER — APPOINTMENT (OUTPATIENT)
Dept: NEUROLOGY | Facility: CLINIC | Age: 64
End: 2024-12-19
Payer: MEDICAID